# Patient Record
Sex: FEMALE | Race: BLACK OR AFRICAN AMERICAN
[De-identification: names, ages, dates, MRNs, and addresses within clinical notes are randomized per-mention and may not be internally consistent; named-entity substitution may affect disease eponyms.]

---

## 2020-10-29 PROBLEM — Z00.00 ENCOUNTER FOR PREVENTIVE HEALTH EXAMINATION: Status: ACTIVE | Noted: 2020-10-29

## 2020-11-19 ENCOUNTER — APPOINTMENT (OUTPATIENT)
Dept: SURGICAL ONCOLOGY | Facility: CLINIC | Age: 30
End: 2020-11-19
Payer: MEDICAID

## 2020-11-19 ENCOUNTER — TRANSCRIPTION ENCOUNTER (OUTPATIENT)
Age: 30
End: 2020-11-19

## 2020-11-19 VITALS
DIASTOLIC BLOOD PRESSURE: 80 MMHG | HEIGHT: 66 IN | OXYGEN SATURATION: 99 % | HEART RATE: 76 BPM | BODY MASS INDEX: 22.18 KG/M2 | SYSTOLIC BLOOD PRESSURE: 129 MMHG | WEIGHT: 138 LBS

## 2020-11-19 PROCEDURE — 99203 OFFICE O/P NEW LOW 30 MIN: CPT

## 2020-11-19 NOTE — PHYSICAL EXAM
[Normal] : supple, no neck mass and thyroid not enlarged [Normal Neck Lymph Nodes] : normal neck lymph nodes  [Normal Supraclavicular Lymph Nodes] : normal supraclavicular lymph nodes [Normal Groin Lymph Nodes] : normal groin lymph nodes [Normal Axillary Lymph Nodes] : normal axillary lymph nodes [Normal] : normal appearance, no rash, nodules, vesicles, ulcers, erythema [de-identified] : Groins not examined [de-identified] : Below

## 2020-11-19 NOTE — ASSESSMENT
[FreeTextEntry1] : We had a discussion regarding the palpable area in the upper right breast.\par This corresponds to a simple cyst, ~2 cm diameter on sonography.\par \par Multiple additional smaller, asymptomatic, nonpalpable cystic lesions bilaterally.\par \par Offered the option of sonogram guided aspiration, versus observation.\par Since she is asymptomatic, and not worried about the cyst, once reassured, we will not arrange for an invasive procedure presently.\par \par Instead, we will repeat her ultrasound at a 6-month interval, April 2021, prescription provided.\par Disc from recent imaging returned to her.\par \par \par Reviewed in detail, all questions answered.\par \par Note dictated

## 2020-11-19 NOTE — HISTORY OF PRESENT ILLNESS
[de-identified] : 30-year-old lady referred by MIGUEL Das, for breast examination.\par \par This is an asymptomatic lump that she noticed on self-examination about 1 month prior.\par Was not preceded by any injury.\par No accompanying additional signs or symptoms related to either breast.\par \par The area is not tender.\par \par No previous breast biopsies.\par No personal history of breast disease.\par \par Menarche at 15.\par  1, para 1 at 21.\par She does not take oral contraceptives.\par \par \par \par + FH:\par She has an maternal aunt with breast cancer.\par Paternal grandmother also had carcinoma the breast..\par \par No relatives with ovarian cancer.\par \par Not Ashkenazi.\par \par No other relatives with a history of malignancy.\par \par Her breast cancer risk score is 16.3.\par \par \par 10-22-20:\par Bilateral breast ultrasounds at [facility]: BI-RADS 2.\par Palpable mass right breast mass (12:00) corresponds to a simple cyst (2.1 x 1.4 x 1.8 cm.\par There are multiple additional cystic lesions bilaterally.\par \par \par For her wellness visit she sees a nurse practitioner at [facility] (Bernadette FIELDS).\par Her interval change on self-exam, and a visit to the above practitioner, prompted this visit.\par \par Her last visit to her gynecologist was in 2018, she does not recall any further details.\par I suggested a follow-up visit.

## 2020-11-19 NOTE — REASON FOR VISIT
[Initial Consultation] : an initial consultation for [Other: _____] : [unfilled] [FreeTextEntry2] : Palpable lump in the upper right breast, which corresponds to a cyst on sonography

## 2021-04-06 PROBLEM — N64.59 ABNORMAL BREAST EXAM: Status: ACTIVE | Noted: 2020-11-18

## 2021-04-08 ENCOUNTER — APPOINTMENT (OUTPATIENT)
Dept: SURGICAL ONCOLOGY | Facility: CLINIC | Age: 31
End: 2021-04-08

## 2021-04-08 DIAGNOSIS — N64.59 OTHER SIGNS AND SYMPTOMS IN BREAST: ICD-10-CM

## 2021-04-08 NOTE — HISTORY OF PRESENT ILLNESS
[de-identified] : 30-year-old lady 30-year-old lady seen for consultation in 2020 with a palpable lump in her right breast (upper inner.\par This was an asymptomatic she discovered on BSE the preceding month (2020).\par \par No prior personal history of breast disease.\par No first-degree relatives with breast cancer, or ovarian malignancy.\par \par My PE:\par Right breast (1:00, periareolar):\par ~1 cm smooth, round, mobile, nontender palpable finding.\par No other visible or palpable abnormality in either breast.\par \par We had discussed the options of image guided aspiration, or observation.\par The latter was her preference.\par \par I had provided her with a prescription for a 6-month follow-up right breast ultrasound in 2021, at that index visit.\par \par She returns for scheduled follow-up today.\par \par \par 2020, she was referred by MIGUEL Das, for breast examination.\par \par This was an asymptomatic lump that she noticed on self-examination about 1 month prior.\par Was not preceded by any injury.\par No accompanying additional signs or symptoms related to either breast.\par \par The area was not tender.\par \par No previous breast biopsies.\par No personal history of breast disease.\par \par Menarche at 15.\par  1, para 1 at 21.\par No oral contraceptives.\par \par \par + FH:\par She has an maternal aunt with breast cancer.\par Paternal grandmother also had carcinoma the breast..\par \par No relatives with ovarian cancer.\par \par Not Ashkenazi.\par \par No other relatives with a history of malignancy.\par \par Her breast cancer risk score is 16.3.\par \par \par 10-22-20:\par Bilateral breast ultrasounds at [facility]: BI-RADS 2.\par Palpable mass right breast mass (12:00) corresponds to a simple cyst (2.1 x 1.4 x 1.8 cm.\par There were multiple additional cystic lesions bilaterally.\par \par \par For her wellness visit she sees a nurse practitioner at [facility] (Bernadette FIELDS).\par Her interval change on self-exam, and a visit to the above practitioner, prompted this visit.\par \par \par Her last visit to her gynecologist was in 2018, she does not recall any further details.\par I suggested a follow-up visit.

## 2021-04-08 NOTE — ASSESSMENT
[FreeTextEntry1] : 4-8-21, she did not keep her appointment for follow-up evaluation of her palpable right breast cyst

## 2021-04-08 NOTE — PHYSICAL EXAM
[Normal] : supple, no neck mass and thyroid not enlarged [Normal Neck Lymph Nodes] : normal neck lymph nodes  [Normal Supraclavicular Lymph Nodes] : normal supraclavicular lymph nodes [Normal Groin Lymph Nodes] : normal groin lymph nodes [Normal Axillary Lymph Nodes] : normal axillary lymph nodes [Normal] : normal appearance, no rash, nodules, vesicles, ulcers, erythema [de-identified] : Groins not examined [de-identified] : Below

## 2021-04-08 NOTE — REASON FOR VISIT
[FreeTextEntry2] : 4-8-21, she did not keep her appointment for follow-up evaluation of her palpable right breast cyst